# Patient Record
Sex: MALE | Race: BLACK OR AFRICAN AMERICAN | NOT HISPANIC OR LATINO | Employment: FULL TIME | URBAN - METROPOLITAN AREA
[De-identification: names, ages, dates, MRNs, and addresses within clinical notes are randomized per-mention and may not be internally consistent; named-entity substitution may affect disease eponyms.]

---

## 2018-12-04 PROBLEM — M17.0 PRIMARY OSTEOARTHRITIS OF BOTH KNEES: Status: ACTIVE | Noted: 2018-12-04

## 2018-12-04 PROBLEM — M22.2X1 PATELLOFEMORAL ARTHRALGIA OF BOTH KNEES: Status: ACTIVE | Noted: 2018-12-04

## 2018-12-04 PROBLEM — M22.2X2 PATELLOFEMORAL ARTHRALGIA OF BOTH KNEES: Status: ACTIVE | Noted: 2018-12-04

## 2019-03-04 PROBLEM — M22.41 CHONDROMALACIA OF BOTH PATELLAE: Status: ACTIVE | Noted: 2019-03-04

## 2019-03-04 PROBLEM — M22.42 CHONDROMALACIA OF BOTH PATELLAE: Status: ACTIVE | Noted: 2019-03-04

## 2023-10-19 ENCOUNTER — TELEPHONE (OUTPATIENT)
Dept: GASTROENTEROLOGY | Facility: CLINIC | Age: 53
End: 2023-10-19

## 2023-10-19 NOTE — TELEPHONE ENCOUNTER
Msg was sent to me via referral. I call pt to schedule. He doesn't answer. I leave him a brief vm.